# Patient Record
Sex: FEMALE
[De-identification: names, ages, dates, MRNs, and addresses within clinical notes are randomized per-mention and may not be internally consistent; named-entity substitution may affect disease eponyms.]

---

## 2023-01-04 ENCOUNTER — APPOINTMENT (OUTPATIENT)
Dept: PODIATRY | Facility: CLINIC | Age: 38
End: 2023-01-04
Payer: COMMERCIAL

## 2023-01-04 DIAGNOSIS — M65.9 SYNOVITIS AND TENOSYNOVITIS, UNSPECIFIED: ICD-10-CM

## 2023-01-04 DIAGNOSIS — Z80.3 FAMILY HISTORY OF MALIGNANT NEOPLASM OF BREAST: ICD-10-CM

## 2023-01-04 PROBLEM — Z00.00 ENCOUNTER FOR PREVENTIVE HEALTH EXAMINATION: Status: ACTIVE | Noted: 2023-01-04

## 2023-01-04 PROCEDURE — 20600 DRAIN/INJ JOINT/BURSA W/O US: CPT | Mod: RT

## 2023-01-04 PROCEDURE — 73630 X-RAY EXAM OF FOOT: CPT | Mod: RT

## 2023-01-04 PROCEDURE — 99203 OFFICE O/P NEW LOW 30 MIN: CPT | Mod: 25

## 2023-01-18 ENCOUNTER — APPOINTMENT (OUTPATIENT)
Dept: PODIATRY | Facility: CLINIC | Age: 38
End: 2023-01-18
Payer: COMMERCIAL

## 2023-01-18 PROCEDURE — 99212 OFFICE O/P EST SF 10 MIN: CPT

## 2023-01-23 PROBLEM — M65.9 SYNOVITIS OF FOOT: Status: ACTIVE | Noted: 2023-01-04

## 2023-01-24 NOTE — HISTORY OF PRESENT ILLNESS
[FreeTextEntry1] : 37 year old female presents today with a chief complaint of chronic pain, lateral aspect of the right foot, 2 years duration.  She had seen Dr. Ji Cm who ordered an MRI on 11/17/22 that had shown no significant changes in the osseous structures.  No evidence of any soft tissue masses.   Patient had been placed in a boot and a wrap without relief.  She had an injection as well, also without relief.  She is here with acute pain which seems to be at the 4th and 5th met. cuboid joint, right foot.

## 2023-01-24 NOTE — REASON FOR VISIT
[Initial Visit] : an initial visit for [Other:___] : [unfilled] [Foot Pain] : foot pain [FreeTextEntry2] : right

## 2023-01-24 NOTE — PHYSICAL EXAM
[General Appearance - Alert] : alert [2+] : left foot dorsalis pedis 2+ [Skin Color & Pigmentation] : normal skin color and pigmentation [Skin Turgor] : normal skin turgor [] : no rash [Skin Lesions] : no skin lesions [Sensation] : the sensory exam was normal to light touch and pinprick [No Focal Deficits] : no focal deficits [Deep Tendon Reflexes (DTR)] : deep tendon reflexes were 2+ and symmetric [Motor Exam] : the motor exam was normal [Oriented To Time, Place, And Person] : oriented to person, place, and time [Delayed in the Right Toes] : capillary refills normal in right toes [Delayed in the Left Toes] : capillary refills normal in the left toes [FreeTextEntry3] : Temperature gradient: warm to cool. [de-identified] : Pain on ROM, right 4th and 5th met. cuboid joint.  No evidence of any soft tissue mass.   [Foot Ulcer] : no foot ulcer [Skin Induration] : no skin induration

## 2023-01-24 NOTE — ASSESSMENT
[Verbal] : verbal [Patient] : patient [Good - alert, interested, motivated] : Good - alert, interested, motivated [Signs and symptoms of infection] : sign and symptoms of infection [Pain Management] : pain management [Off-loading] : off-loading [FreeTextEntry1] : Impression: Probably synovitis, 4th and 5th met. cuboid joints, right. \par \par Recommendations: An attempt at another injection.  After discussing all risks, benefits and alternatives, patient consented.\par \par Treatment: Patient was injected with a combination of 1% Xylocaine, plain, 0.5% Marcaine, plain and Kenalog-40, a total of 2cc's into the lesser tarsus, right at the 4th and 5th met. cuboid joint, right foot.  \par Patient was given home care instructions as well as a prescription for physical therapy.  Any questions or problems, patient is to contact the office.

## 2023-01-24 NOTE — CONSULT LETTER
[Dear  ___] : Dear  [unfilled], [Consult Letter:] : I had the pleasure of evaluating your patient, [unfilled]. [Please see my note below.] : Please see my note below. [Consult Closing:] : Thank you very much for allowing me to participate in the care of this patient.  If you have any questions, please do not hesitate to contact me. [Sincerely,] : Sincerely, [FreeTextEntry2] : Ji Cm MD\par [id number]\[place] [address] [FreeTextEntry3] : Charles Lombardi, DPM

## 2023-01-24 NOTE — PROCEDURE
[Other:____] : ~M [unfilled] [Right Foot] : was performed on the right foot [Therapeutic] : therapeutic [Consent Obtained] : Written consent was obtained prior to the procedure and is detailed in the patient's record [Patient] : Prior to the start of the procedure a time out was taken and the identity of the patient was confirmed via name and date of birth with the patient. The correct site and the procedure to be performed were confirmed. The correct side was confirmed if applicable. The availability of the correct equipment was verified [1%] : 1%  [25 gauge] : A 25 gauge needle was used [Kenalog 40] : Kenalog 40 [Tolerated Well] : tolerated the procedure well [No Complications] : There were no complications. [Instructions Given] : given handouts/patient instructions [Patient Instructed to Call] : instructed to call if redness at site, a decrease in range of motion or an increase in pain is noted after procedure. 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  No significant limitation of motion.  \par \par X-rays were taken to assess the osseous structures and to rule out for any fractures.\par (DP, medial oblique, lateral - right foot) No evidence of any fracture/dislocation.

## 2023-01-27 NOTE — HISTORY OF PRESENT ILLNESS
[Sneakers] : kemi [FreeTextEntry1] : Patient presents today for reevaluation of synovitis, right foot.  She is 80% improved.  Pain is decreased.  Swelling is decreased.  She is exercising.  No other changes to her medical status.\par

## 2023-01-27 NOTE — PHYSICAL EXAM
[General Appearance - Alert] : alert [2+] : left foot dorsalis pedis 2+ [Skin Color & Pigmentation] : normal skin color and pigmentation [Skin Turgor] : normal skin turgor [] : no rash [Skin Lesions] : no skin lesions [Sensation] : the sensory exam was normal to light touch and pinprick [No Focal Deficits] : no focal deficits [Deep Tendon Reflexes (DTR)] : deep tendon reflexes were 2+ and symmetric [Motor Exam] : the motor exam was normal [Delayed in the Right Toes] : capillary refills normal in right toes [Delayed in the Left Toes] : capillary refills normal in the left toes [FreeTextEntry3] : Temperature gradient: warm to cool. [de-identified] : Pain on ROM, right 4th and 5th met. cuboid joint, improved by 80%. [Foot Ulcer] : no foot ulcer [Skin Induration] : no skin induration

## 2023-01-27 NOTE — ASSESSMENT
[FreeTextEntry1] : Impression: Pain in joint (M25.50).  Effusion of the joint (M25.40).  Showing improvement by 80%.\par \par Treatment: Patient cannot take nonsteroidal anti-inflammatories because she donated a kidney to her brother and she was advised to take Tylenol, ice therapy and rest and hopefully, this will resolve.  If it does, not further intervention is warranted, and patient is discharged.  \par